# Patient Record
Sex: FEMALE | Race: WHITE | Employment: FULL TIME | URBAN - METROPOLITAN AREA
[De-identification: names, ages, dates, MRNs, and addresses within clinical notes are randomized per-mention and may not be internally consistent; named-entity substitution may affect disease eponyms.]

---

## 2017-02-04 ENCOUNTER — APPOINTMENT (OUTPATIENT)
Dept: GENERAL RADIOLOGY | Age: 62
DRG: 386 | End: 2017-02-04
Attending: INTERNAL MEDICINE
Payer: COMMERCIAL

## 2017-02-04 ENCOUNTER — APPOINTMENT (OUTPATIENT)
Dept: CT IMAGING | Age: 62
DRG: 386 | End: 2017-02-04
Attending: INTERNAL MEDICINE
Payer: COMMERCIAL

## 2017-02-04 ENCOUNTER — HOSPITAL ENCOUNTER (INPATIENT)
Age: 62
LOS: 3 days | Discharge: HOME | DRG: 386 | End: 2017-02-07
Attending: INTERNAL MEDICINE | Admitting: SURGERY
Payer: COMMERCIAL

## 2017-02-04 DIAGNOSIS — K56.609 SBO (SMALL BOWEL OBSTRUCTION) (HCC): Primary | ICD-10-CM

## 2017-02-04 DIAGNOSIS — G43.A0 CYCLICAL VOMITING WITH NAUSEA, INTRACTABILITY OF VOMITING NOT SPECIFIED: ICD-10-CM

## 2017-02-04 DIAGNOSIS — R10.10 PAIN OF UPPER ABDOMEN: ICD-10-CM

## 2017-02-04 PROBLEM — N39.0 UTI (URINARY TRACT INFECTION): Status: ACTIVE | Noted: 2017-02-04

## 2017-02-04 LAB
ALBUMIN SERPL BCP-MCNC: 4.5 G/DL (ref 3.4–5)
ALBUMIN/GLOB SERPL: 1.3 {RATIO} (ref 0.8–1.7)
ALP SERPL-CCNC: 48 U/L (ref 45–117)
ALT SERPL-CCNC: 36 U/L (ref 13–56)
ANION GAP BLD CALC-SCNC: 14 MMOL/L (ref 3–18)
APPEARANCE UR: CLEAR
AST SERPL W P-5'-P-CCNC: 32 U/L (ref 15–37)
BACTERIA URNS QL MICRO: ABNORMAL /HPF
BASOPHILS # BLD AUTO: 0.1 K/UL (ref 0–0.06)
BASOPHILS # BLD: 0 % (ref 0–2)
BILIRUB SERPL-MCNC: 0.5 MG/DL (ref 0.2–1)
BILIRUB UR QL: NEGATIVE
BUN SERPL-MCNC: 13 MG/DL (ref 7–18)
BUN/CREAT SERPL: 11 (ref 12–20)
CALCIUM SERPL-MCNC: 9.3 MG/DL (ref 8.5–10.1)
CHLORIDE SERPL-SCNC: 103 MMOL/L (ref 100–108)
CK MB CFR SERPL CALC: 9.2 % (ref 0–4)
CK MB SERPL-MCNC: 15.7 NG/ML (ref 0.5–3.6)
CK SERPL-CCNC: 171 U/L (ref 26–192)
CO2 SERPL-SCNC: 26 MMOL/L (ref 21–32)
COLOR UR: YELLOW
CREAT SERPL-MCNC: 1.19 MG/DL (ref 0.6–1.3)
DIFFERENTIAL METHOD BLD: ABNORMAL
EOSINOPHIL # BLD: 0.4 K/UL (ref 0–0.4)
EOSINOPHIL NFR BLD: 2 % (ref 0–5)
EPITH CASTS URNS QL MICRO: ABNORMAL /LPF (ref 0–5)
ERYTHROCYTE [DISTWIDTH] IN BLOOD BY AUTOMATED COUNT: 13.1 % (ref 11.6–14.5)
EST. AVERAGE GLUCOSE BLD GHB EST-MCNC: 120 MG/DL
GLOBULIN SER CALC-MCNC: 3.4 G/DL (ref 2–4)
GLUCOSE SERPL-MCNC: 148 MG/DL (ref 74–99)
GLUCOSE UR STRIP.AUTO-MCNC: NEGATIVE MG/DL
HBA1C MFR BLD: 5.8 % (ref 4.5–5.6)
HCT VFR BLD AUTO: 41.5 % (ref 35–45)
HGB BLD-MCNC: 13.8 G/DL (ref 12–16)
HGB UR QL STRIP: NEGATIVE
KETONES UR QL STRIP.AUTO: NEGATIVE MG/DL
LACTATE SERPL-SCNC: 1.2 MMOL/L (ref 0.4–2)
LACTATE SERPL-SCNC: 2.8 MMOL/L (ref 0.4–2)
LEUKOCYTE ESTERASE UR QL STRIP.AUTO: ABNORMAL
LIPASE SERPL-CCNC: 95 U/L (ref 73–393)
LYMPHOCYTES # BLD AUTO: 13 % (ref 21–52)
LYMPHOCYTES # BLD: 2.1 K/UL (ref 0.9–3.6)
MAGNESIUM SERPL-MCNC: 1.7 MG/DL (ref 1.8–2.4)
MCH RBC QN AUTO: 29.9 PG (ref 24–34)
MCHC RBC AUTO-ENTMCNC: 33.3 G/DL (ref 31–37)
MCV RBC AUTO: 89.8 FL (ref 74–97)
MONOCYTES # BLD: 1.3 K/UL (ref 0.05–1.2)
MONOCYTES NFR BLD AUTO: 8 % (ref 3–10)
MUCOUS THREADS URNS QL MICRO: ABNORMAL /LPF
NEUTS SEG # BLD: 12.6 K/UL (ref 1.8–8)
NEUTS SEG NFR BLD AUTO: 77 % (ref 40–73)
NITRITE UR QL STRIP.AUTO: NEGATIVE
PH UR STRIP: 6.5 [PH] (ref 5–8)
PLATELET # BLD AUTO: 450 K/UL (ref 135–420)
PMV BLD AUTO: 9.7 FL (ref 9.2–11.8)
POTASSIUM SERPL-SCNC: 4.1 MMOL/L (ref 3.5–5.5)
PROT SERPL-MCNC: 7.9 G/DL (ref 6.4–8.2)
PROT UR STRIP-MCNC: NEGATIVE MG/DL
RBC # BLD AUTO: 4.62 M/UL (ref 4.2–5.3)
RBC #/AREA URNS HPF: ABNORMAL /HPF (ref 0–5)
SODIUM SERPL-SCNC: 143 MMOL/L (ref 136–145)
SP GR UR REFRACTOMETRY: 1.02 (ref 1–1.03)
TROPONIN I SERPL-MCNC: <0.02 NG/ML (ref 0–0.06)
UROBILINOGEN UR QL STRIP.AUTO: 0.2 EU/DL (ref 0.2–1)
WBC # BLD AUTO: 16.4 K/UL (ref 4.6–13.2)
WBC URNS QL MICRO: ABNORMAL /HPF (ref 0–5)

## 2017-02-04 PROCEDURE — 96375 TX/PRO/DX INJ NEW DRUG ADDON: CPT

## 2017-02-04 PROCEDURE — 71010 XR CHEST PORT: CPT

## 2017-02-04 PROCEDURE — 65270000029 HC RM PRIVATE

## 2017-02-04 PROCEDURE — 83735 ASSAY OF MAGNESIUM: CPT | Performed by: INTERNAL MEDICINE

## 2017-02-04 PROCEDURE — 74177 CT ABD & PELVIS W/CONTRAST: CPT

## 2017-02-04 PROCEDURE — 85025 COMPLETE CBC W/AUTO DIFF WBC: CPT | Performed by: INTERNAL MEDICINE

## 2017-02-04 PROCEDURE — 96361 HYDRATE IV INFUSION ADD-ON: CPT

## 2017-02-04 PROCEDURE — 96374 THER/PROPH/DIAG INJ IV PUSH: CPT

## 2017-02-04 PROCEDURE — 80053 COMPREHEN METABOLIC PANEL: CPT | Performed by: INTERNAL MEDICINE

## 2017-02-04 PROCEDURE — 0D9670Z DRAINAGE OF STOMACH WITH DRAINAGE DEVICE, VIA NATURAL OR ARTIFICIAL OPENING: ICD-10-PCS | Performed by: SURGERY

## 2017-02-04 PROCEDURE — 74000 XR ABD (KUB): CPT

## 2017-02-04 PROCEDURE — 93005 ELECTROCARDIOGRAM TRACING: CPT

## 2017-02-04 PROCEDURE — 81001 URINALYSIS AUTO W/SCOPE: CPT | Performed by: INTERNAL MEDICINE

## 2017-02-04 PROCEDURE — 74011000250 HC RX REV CODE- 250: Performed by: INTERNAL MEDICINE

## 2017-02-04 PROCEDURE — 74011250636 HC RX REV CODE- 250/636: Performed by: INTERNAL MEDICINE

## 2017-02-04 PROCEDURE — 87086 URINE CULTURE/COLONY COUNT: CPT | Performed by: SURGERY

## 2017-02-04 PROCEDURE — 99285 EMERGENCY DEPT VISIT HI MDM: CPT

## 2017-02-04 PROCEDURE — 82550 ASSAY OF CK (CPK): CPT | Performed by: INTERNAL MEDICINE

## 2017-02-04 PROCEDURE — 83605 ASSAY OF LACTIC ACID: CPT | Performed by: INTERNAL MEDICINE

## 2017-02-04 PROCEDURE — 83690 ASSAY OF LIPASE: CPT | Performed by: INTERNAL MEDICINE

## 2017-02-04 PROCEDURE — 74011636320 HC RX REV CODE- 636/320: Performed by: INTERNAL MEDICINE

## 2017-02-04 PROCEDURE — 74011250636 HC RX REV CODE- 250/636: Performed by: SURGERY

## 2017-02-04 PROCEDURE — 83036 HEMOGLOBIN GLYCOSYLATED A1C: CPT | Performed by: INTERNAL MEDICINE

## 2017-02-04 RX ORDER — SODIUM CHLORIDE 0.9 % (FLUSH) 0.9 %
5-10 SYRINGE (ML) INJECTION EVERY 8 HOURS
Status: DISCONTINUED | OUTPATIENT
Start: 2017-02-04 | End: 2017-02-07 | Stop reason: HOSPADM

## 2017-02-04 RX ORDER — MONTELUKAST SODIUM 10 MG/1
10 TABLET ORAL
COMMUNITY

## 2017-02-04 RX ORDER — HYDROMORPHONE HYDROCHLORIDE 1 MG/ML
1 INJECTION, SOLUTION INTRAMUSCULAR; INTRAVENOUS; SUBCUTANEOUS
Status: DISCONTINUED | OUTPATIENT
Start: 2017-02-04 | End: 2017-02-06

## 2017-02-04 RX ORDER — HYDROMORPHONE HYDROCHLORIDE 1 MG/ML
1 INJECTION, SOLUTION INTRAMUSCULAR; INTRAVENOUS; SUBCUTANEOUS ONCE
Status: COMPLETED | OUTPATIENT
Start: 2017-02-04 | End: 2017-02-04

## 2017-02-04 RX ORDER — DIPHENHYDRAMINE HYDROCHLORIDE 50 MG/ML
12.5 INJECTION, SOLUTION INTRAMUSCULAR; INTRAVENOUS
Status: DISCONTINUED | OUTPATIENT
Start: 2017-02-04 | End: 2017-02-07 | Stop reason: HOSPADM

## 2017-02-04 RX ORDER — MORPHINE SULFATE 2 MG/ML
2 INJECTION, SOLUTION INTRAMUSCULAR; INTRAVENOUS
Status: COMPLETED | OUTPATIENT
Start: 2017-02-04 | End: 2017-02-04

## 2017-02-04 RX ORDER — KETOROLAC TROMETHAMINE 30 MG/ML
30 INJECTION, SOLUTION INTRAMUSCULAR; INTRAVENOUS
Status: ACTIVE | OUTPATIENT
Start: 2017-02-04 | End: 2017-02-05

## 2017-02-04 RX ORDER — FENOFIBRATE 120 MG/1
120 TABLET ORAL
COMMUNITY

## 2017-02-04 RX ORDER — DEXTROSE, SODIUM CHLORIDE, AND POTASSIUM CHLORIDE 5; .45; .15 G/100ML; G/100ML; G/100ML
125 INJECTION INTRAVENOUS CONTINUOUS
Status: DISCONTINUED | OUTPATIENT
Start: 2017-02-04 | End: 2017-02-06

## 2017-02-04 RX ORDER — SODIUM CHLORIDE 9 MG/ML
999 INJECTION, SOLUTION INTRAVENOUS CONTINUOUS
Status: DISCONTINUED | OUTPATIENT
Start: 2017-02-04 | End: 2017-02-06

## 2017-02-04 RX ORDER — MONTELUKAST SODIUM 4 MG/1
2 TABLET, CHEWABLE ORAL 2 TIMES DAILY
COMMUNITY

## 2017-02-04 RX ORDER — ONDANSETRON 2 MG/ML
4 INJECTION INTRAMUSCULAR; INTRAVENOUS
Status: COMPLETED | OUTPATIENT
Start: 2017-02-04 | End: 2017-02-04

## 2017-02-04 RX ORDER — SODIUM CHLORIDE 0.9 % (FLUSH) 0.9 %
5-10 SYRINGE (ML) INJECTION AS NEEDED
Status: DISCONTINUED | OUTPATIENT
Start: 2017-02-04 | End: 2017-02-07 | Stop reason: HOSPADM

## 2017-02-04 RX ORDER — FAMOTIDINE 10 MG/ML
20 INJECTION INTRAVENOUS
Status: COMPLETED | OUTPATIENT
Start: 2017-02-04 | End: 2017-02-04

## 2017-02-04 RX ORDER — LEVOCETIRIZINE DIHYDROCHLORIDE 5 MG/1
5 TABLET, FILM COATED ORAL
COMMUNITY

## 2017-02-04 RX ORDER — ONDANSETRON 2 MG/ML
4 INJECTION INTRAMUSCULAR; INTRAVENOUS
Status: DISCONTINUED | OUTPATIENT
Start: 2017-02-04 | End: 2017-02-07 | Stop reason: HOSPADM

## 2017-02-04 RX ADMIN — METHYLPREDNISOLONE SODIUM SUCCINATE 20 MG: 40 INJECTION, POWDER, FOR SOLUTION INTRAMUSCULAR; INTRAVENOUS at 14:57

## 2017-02-04 RX ADMIN — Medication 10 ML: at 21:02

## 2017-02-04 RX ADMIN — HYDROMORPHONE HYDROCHLORIDE 1 MG: 1 INJECTION, SOLUTION INTRAMUSCULAR; INTRAVENOUS; SUBCUTANEOUS at 07:10

## 2017-02-04 RX ADMIN — HYDROMORPHONE HYDROCHLORIDE 1 MG: 1 INJECTION, SOLUTION INTRAMUSCULAR; INTRAVENOUS; SUBCUTANEOUS at 21:01

## 2017-02-04 RX ADMIN — METHYLPREDNISOLONE SODIUM SUCCINATE 20 MG: 40 INJECTION, POWDER, FOR SOLUTION INTRAMUSCULAR; INTRAVENOUS at 21:01

## 2017-02-04 RX ADMIN — FAMOTIDINE 20 MG: 10 INJECTION, SOLUTION INTRAVENOUS at 04:29

## 2017-02-04 RX ADMIN — IOPAMIDOL 100 ML: 612 INJECTION, SOLUTION INTRAVENOUS at 05:54

## 2017-02-04 RX ADMIN — Medication 2 MG: at 04:30

## 2017-02-04 RX ADMIN — ONDANSETRON 4 MG: 2 INJECTION INTRAMUSCULAR; INTRAVENOUS at 04:29

## 2017-02-04 RX ADMIN — SODIUM CHLORIDE 125 ML/HR: 900 INJECTION, SOLUTION INTRAVENOUS at 04:28

## 2017-02-04 RX ADMIN — DEXTROSE MONOHYDRATE, SODIUM CHLORIDE, AND POTASSIUM CHLORIDE 125 ML/HR: 50; 4.5; 1.49 INJECTION, SOLUTION INTRAVENOUS at 10:47

## 2017-02-04 RX ADMIN — SODIUM CHLORIDE 1000 ML: 900 INJECTION, SOLUTION INTRAVENOUS at 07:58

## 2017-02-04 NOTE — ED NOTES
D/W pt at length that she will not be able to be discharged without a ride at bedside even if she is ready for discharge until at least 0830 r/t IV narcotic pain medication. Pt states that her mother lives right next door to the hospital. Advised pt that she cannot be released to walk either. Pt verbalized understanding and agrees.

## 2017-02-04 NOTE — ROUTINE PROCESS
TRANSFER - OUT REPORT:    Verbal report given to CHICA Dacosta (name) on Gwynne August  being transferred to [address] [place] [address] (unit) for routine progression of care       Report consisted of patients Situation, Background, Assessment and   Recommendations(SBAR). Information from the following report(s) SBAR, Kardex, ED Summary, [facility] and Recent Results MEWS  was reviewed with the receiving nurse. Lines:   Peripheral IV 02/04/17 Right Antecubital (Active)        Opportunity for questions and clarification was provided. Patient transported with:   Tech      Lactic acid in process  KUB completed, awaiting results.

## 2017-02-04 NOTE — ED NOTES
Called to CT to check if pt can come for study. CT tech states that he will call to ER when he is ready for pt.

## 2017-02-04 NOTE — H&P
[address] [place] [address]  ROUTINE HISTORY AND PHYSICAL    Name:  Aly Brown  MR#:  [id number]  :  1955  Account #:  [de-identified]  Date of Adm:  2017      HISTORY OF PRESENT ILLNESS: The patient is a 22-year-old  patient who has a history of Crohn's disease and previous surgeries for  Crohn's disease and bowel resection. She presents with 1 day of  abdominal pain. The patient was seen in the emergency room, where a  CT scan showed bowel obstruction. The patient is followed by Dr. Washington Huff and sees her yearly, she says. She says she has had bloating  and gas pain for quite some time; however, did not follow up with her  doctor for this. She is on medications for Crohn's disease. She has had  surgery years ago. One was at [facility]. Her medical records are  unavailable. PAST MEDICAL HISTORY: Significant for Crohn's disease,  hypertension, depression, anxiety. Please see patient's list of medications and allergies on MAR. REVIEW OF SYSTEMS: As above. SOCIAL HISTORY: She does not smoke or drink. FAMILY HISTORY: Noncontributory. PHYSICAL EXAMINATION  VITAL SIGNS: Stable. She is afebrile. HEENT: Unremarkable. LUNGS: Clear to auscultation bilaterally. HEART: Regular rate and rhythm. No murmurs, gallops or rubs. ABDOMEN: Soft, slightly distended, general tenderness. No rebound. No hernias. Well-healed large midline scar. NEUROLOGIC: Grossly intact. PSYCHIATRIC: Grossly intact. MUSCULOSKELETAL: Grossly intact, +2 pulses bilaterally. ASSESSMENT AND PLAN: This is a patient who presents with a  history of Crohn's disease, possible bowel obstruction. NG tube has  been placed. She will be started on IV antibiotics.  I am unsure of her  medications, however, I will consult Gastroenterology to recommend  medications for an acute exasperation of her Crohn's disease, given  that she has had 2 bowel resections previously would be advisable to  try and treat this conservatively without further surgery. I am going to  recommend treating her conservatively with medication to try and get  her over this episode. She will require further studies such as a small  bowel study and to try and get her operative reports. I have discussed  this with the patient and she understands.         MD Dimitrios Gandhi / [employer]  D:  02/04/2017   09:51  T:  02/04/2017   10:24  Job #:  [id number]

## 2017-02-04 NOTE — ED TRIAGE NOTES
Pt brought in by [facility]  for abdominal pain and exacerbation of her crohn's disease. Pt states \" I started hurting around 17:00 pm and then it woke me around mid night. I have had 2 episodes of vomiting and am having diarrhea. The pain is a 7/10 in my stomach. \"  Vitals:    02/04/17 0349   BP: 117/70   Pulse: 72   Resp: 18   Temp: 98 °F (36.7 °C)   SpO2: 100%     Sepsis Screening completed    (  )Patient meets SIRS criteria. (  x)Patient does not meet SIRS criteria.       SIRS Criteria is achieved when two or more of the following are present   Temperature < 96.8°F (36°C) or > 100.9°F (38.3°C)   Heart Rate > 90 beats per minute   Respiratory Rate > 20 beats per minute   WBC count > 12,000 or <4,000 or > 10% bands

## 2017-02-04 NOTE — IP AVS SNAPSHOT
Maliha Stoner 
 
 
 [address] [place] [address] 
[phone] Patient: Odie Severin MRN: [id number] You are allergic to the following Allergen Reactions Demerol (Meperidine) Nausea and Vomiting Immunizations Administered for This Admission Name Date Influenza Vaccine (Quad) PF 2/6/2017 Recent Documentation Height Weight BMI OB Status Smoking Status 1.626 m 62.8 kg 23.76 kg/m2 Hysterectomy Never Smoker Unresulted Labs Order Current Status CALPROTECTIN, FECAL In process PANCREATIC ELASTASE, FECAL In process VITAMIN D, 1, 25 DIHYDROXY In process WBC, STOOL In process Emergency Contacts Name Discharge Info Relation Home Work Mobile Connor Sandoval DISCHARGE CAREGIVER [3] Spouse [3] [phone] About your hospitalization You were admitted on:  February 4, 2017 You last received care in the:  [address] [place] [address] You were discharged on:  February 7, 2017 Unit phone number:  [phone] Why you were hospitalized Your primary diagnosis was:  Not on File Your diagnoses also included:  Sbo (Small Bowel Obstruction) (Hcc), Uti (Urinary Tract Infection) Providers Seen During Your Hospitalizations Provider Role Specialty Primary office phone Nathen Hwang MD Attending Provider Emergency Medicine [phone] Jose A Gutierrez MD Attending Provider General Surgery [phone] Your Primary Care Physician (PCP) Primary Care Physician Office Phone Office Fax [address] [facility] [phone] Follow-up Information Follow up With Details Comments Contact Info Ba Brown MD On 2/10/2017 Follow up appointment scheduled for February 10, 2017 at 10:20 a.m. [address] [place] [address] [facility] 
[phone] Carla Moore MD On 2/9/2017 Follow up appointment scheduled for February 9, 2017 at 10:00 a.m. [address] [place] [address] [facility] [address] [facility] 
[phone] Toño Kiser DO Schedule an appointment as soon as possible for a visit in 2 days  [address] [facility] [address] [facility] 
[phone] Current Discharge Medication List  
  
START taking these medications Dose & Instructions Dispensing Information Comments Morning Noon Evening Bedtime  
 predniSONE 20 mg tablet Commonly known as:  Del Rosario Stair Your next dose is: Today, Tomorrow Other:  _________ Dose:  40 mg Take 2 Tabs by mouth daily. Quantity:  40 Tab Refills:  0 CONTINUE these medications which have NOT CHANGED Dose & Instructions Dispensing Information Comments Morning Noon Evening Bedtime ATENOLOL PO Your next dose is: Today, Tomorrow Other:  _________ Dose:  50 mg Take 50 mg by mouth nightly. Refills:  0  
     
   
   
   
  
 colestipol 1 gram tablet Commonly known as:  COLESTID Your next dose is: Today, Tomorrow Other:  _________ Dose:  2 g Take 2 g by mouth two (2) times a day. Refills:  0 Fenofibrate 120 mg Tab Your next dose is: Today, Tomorrow Other:  _________ Dose:  120 mg Take 120 mg by mouth nightly. Refills:  0  
     
   
   
   
  
 HUMIRA PEN 40 mg/0.8 mL Pnkt Generic drug:  adalimumab Your next dose is: Today, Tomorrow Other:  _________  
   
   
 by SubCUTAneous route. Indications: CROHN'S DISEASE Refills:  0  
     
   
   
   
  
 levocetirizine 5 mg tablet Commonly known as:  Jorene Ashing Your next dose is: Today, Tomorrow Other:  _________ Dose:  5 mg Take 5 mg by mouth nightly. Refills:  0 montelukast 10 mg tablet Commonly known as:  SINGULAIR Your next dose is: Today, Tomorrow Other:  _________ Dose:  10 mg Take 10 mg by mouth nightly. Refills:  0  
     
   
   
   
  
 SYNTHROID PO Your next dose is: Today, Tomorrow Other:  _________ Dose:  112 mcg Take 112 mcg by mouth daily. Refills:  0  
     
   
   
   
  
 ZOLOFT PO Your next dose is: Today, Tomorrow Other:  _________ Dose:  50 mg Take 50 mg by mouth daily. Refills:  0 Where to Get Your Medications Information on where to get these meds will be given to you by the nurse or doctor. ! Ask your nurse or doctor about these medications  
  predniSONE 20 mg tablet Discharge Instructions DISCHARGE SUMMARY from Nurse The following personal items are in your possession at time of discharge: 
 
Dental Appliances: None Visual Aid: Glasses Home Medications: None Jewelry: Ring (wedding ring) Clothing: Bathrobe, Undergarments, Shirt, Pants, Footwear Other Valuables: Cell Phone () PATIENT INSTRUCTIONS: 
 
 
F-face looks uneven A-arms unable to move or move unevenly S-speech slurred or non-existent T-time-call 911 as soon as signs and symptoms begin-DO NOT go Back to bed or wait to see if you get better-TIME IS BRAIN. Warning Signs of HEART ATTACK Call 911 if you have these symptoms: 
? Chest discomfort. Most heart attacks involve discomfort in the center of the chest that lasts more than a few minutes, or that goes away and comes back. It can feel like uncomfortable pressure, squeezing, fullness, or pain. ? Discomfort in other areas of the upper body. Symptoms can include pain or discomfort in one or both arms, the back, neck, jaw, or stomach. ? Shortness of breath with or without chest discomfort. ? Other signs may include breaking out in a cold sweat, nausea, or lightheadedness. Don't wait more than five minutes to call [address]! Fast action can save your life. Calling 911 is almost always the fastest way to get lifesaving treatment. Emergency Medical Services staff can begin treatment when they arrive  up to an hour sooner than if someone gets to the hospital by car. The discharge information has been reviewed with the patient. The patient verbalized understanding. Discharge medications reviewed with the patient and appropriate educational materials and side effects teaching were provided. Patient armband removed and shredded Abdominal Pain: Care Instructions Your Care Instructions Abdominal pain has many possible causes. Some aren't serious and get better on their own in a few days. Others need more testing and treatment. If your pain continues or gets worse, you need to be rechecked and may need more tests to find out what is wrong. You may need surgery to correct the problem. Don't ignore new symptoms, such as fever, nausea and vomiting, urination problems, pain that gets worse, and dizziness. These may be signs of a more serious problem. Your doctor may have recommended a follow-up visit in the next 8 to 12 hours. If you are not getting better, you may need more tests or treatment. The doctor has checked you carefully, but problems can develop later. If you notice any problems or new symptoms, get medical treatment right away. Follow-up care is a key part of your treatment and safety. Be sure to make and go to all appointments, and call your doctor if you are having problems.  It's also a good idea to know your test results and keep a list of the medicines you take. How can you care for yourself at home? · Rest until you feel better. · To prevent dehydration, drink plenty of fluids, enough so that your urine is light yellow or clear like water. Choose water and other caffeine-free clear liquids until you feel better. If you have kidney, heart, or liver disease and have to limit fluids, talk with your doctor before you increase the amount of fluids you drink. · If your stomach is upset, eat mild foods, such as rice, dry toast or crackers, bananas, and applesauce. Try eating several small meals instead of two or three large ones. · Wait until 48 hours after all symptoms have gone away before you have spicy foods, alcohol, and drinks that contain caffeine. · Do not eat foods that are high in fat. · Avoid anti-inflammatory medicines such as aspirin, ibuprofen (Advil, Motrin), and naproxen (Aleve). These can cause stomach upset. Talk to your doctor if you take daily aspirin for another health problem. When should you call for help? Call 911 anytime you think you may need emergency care. For example, call if: 
· You passed out (lost consciousness). · You pass maroon or very bloody stools. · You vomit blood or what looks like coffee grounds. · You have new, severe belly pain. Call your doctor now or seek immediate medical care if: 
· Your pain gets worse, especially if it becomes focused in one area of your belly. · You have a new or higher fever. · Your stools are black and look like tar, or they have streaks of blood. · You have unexpected vaginal bleeding. · You have symptoms of a urinary tract infection. These may include: 
¨ Pain when you urinate. ¨ Urinating more often than usual. 
¨ Blood in your urine. · You are dizzy or lightheaded, or you feel like you may faint. Watch closely for changes in your health, and be sure to contact your doctor if: 
· You are not getting better after 1 day (24 hours). Where can you learn more? Go to http://jose m-ruthy.info/. Enter U633 in the search box to learn more about \"Abdominal Pain: Care Instructions. \" Current as of: May 27, 2016 Content Version: 11.1 © 4855-5682 [employer]. Care instructions adapted under license by [employer] (which disclaims liability or warranty for this information). If you have questions about a medical condition or this instruction, always ask your healthcare professional. [place] [address] any warranty or liability for your use of this information. Discharge Orders None [employer] Announcement We are excited to announce that we are making your provider's discharge notes available to you in [employer]. You will see these notes when they are completed and signed by the physician that discharged you from your recent hospital stay. If you have any questions or concerns about any information you see in [employer], please call the Health Information Department where you were seen or reach out to your Primary Care Provider for more information about your plan of care. Introducing hospitals & HEALTH SERVICES! [employer] introduces [employer] patient portal. Now you can access parts of your medical record, email your doctor's office, and request medication refills online. 1. In your internet browser, go to [email or web address] 2. Click on the First Time User? Click Here link in the Sign In box. You will see the New Member Sign Up page. 3. Enter your [employer] Access Code exactly as it appears below. You will not need to use this code after youve completed the sign-up process. If you do not sign up before the expiration date, you must request a new code. · [employer] Access Code: [email or web address] Expires: 5/5/2017  5:39 AM 
 
4. Enter the last four digits of your Social Security Number (xxxx) and Date of Birth (mm/dd/yyyy) as indicated and click Submit.  You will be taken to the next sign-up page. 5. Create a [employer] ID. This will be your [employer] login ID and cannot be changed, so think of one that is secure and easy to remember. 6. Create a [employer] password. You can change your password at any time. 7. Enter your Password Reset Question and Answer. This can be used at a later time if you forget your password. 8. Enter your e-mail address. You will receive e-mail notification when new information is available in [address] 9. Click Sign Up. You can now view and download portions of your medical record. 10. Click the Download Summary menu link to download a portable copy of your medical information. If you have questions, please visit the Frequently Asked Questions section of the [employer] website. Remember, [employer] is NOT to be used for urgent needs. For medical emergencies, dial 911. Now available from your iPhone and Android! General Information Please provide this summary of care documentation to your next provider. Patient Signature:  ____________________________________________________________ Date:  ____________________________________________________________  
  
[employer] Provider Signature:  ____________________________________________________________ Date:  ____________________________________________________________

## 2017-02-04 NOTE — CONSULTS
[address] [place] [address] Rd    Name:  Jacy Foster  MR#:  [id number]  :  1955  Account #:  [de-identified]  Date of Adm:  2017  Date of Consultation:  2017      A 57-year-old female who was admitted early this morning to our  emergency room where she came by ambulance. The patient claimed  that she has Crohn disease since that was discovered in  during  cholecystectomy, but she was symptomatic for years before. She  underwent 2 small bowel resections in  and  for a total of 3-  1/2 feet. We do not have any previous record on her. [place] [facility] [place] was apparently treated with Remicade for 6  months, but then this was stopped because she moved and was  started on Humira about a year and a half ago by Dr. Tay Anderson. The  patient did not notice a major change since she started the Humira. She has 3-6 loose or liquid, foul-smelling bowel movements a day. She  has been bloated, complaining of flatulence for many years. She rarely  has stools at night. Frequently she has urgency and rarely she can  have fecal incontinence. She denies having any perianal disease or  fistula. Her last colonoscopy apparently from 2 years ago, she was told  to have anastomotic stenosis that would not be passed or dilated. We  do not have a record of this endoscopy either. Her last visit with Dr. Tay Anderson was in 2016. She has been on Humira for a year and half    On top of her 2 previous small bowel surgeries, she had a tubal  pregnancy, total abdominal hysterectomy. She has hypertension and  hypothyroidism. She also has depression. She has been  from her  since 2016. She  works in an office. She has 2 grown up children. She has been  depressed and under stress. ALLERGIES: DEMEROL. MEDICATIONS INCLUDE  1. Humira 40 mg every 2 weeks subcutaneous. 2. Synthroid. 3. Zoloft. 4. Atenolol.     FAMILY HISTORY: No family history of cancer. SOCIAL HISTORY: She does not smoke or drink alcohol. She claimed  that she takes ibuprofen 2 to 3 times every 1 or 2 weeks for finger pain. She claimed that she took the last year a lot of prednisone for upper  respiratory allergies but not for her Crohn disease. FUNCTIONAL INQUIRY: The patient claimed that she started to have  severe, crampy lower abdominal pain that started in the lower  abdomen and then became diffuse and more prominent in the upper  abdomen, associated with bloating, but no increase in abdominal  noise. She vomited on 4 occasions, the first 2 were larger, containing  thick bile and the 2 others were lighter. It made her feel better. She has  not vomited since early this morning around 3 o'clock. She called the  ambulance around 3:30 because she was feeling weak and had  severe abdominal pain and was nauseous. VITAL SIGNS: On arrival at the emergency room, her vital signs were  normal. Blood pressure was 117/70, pulse was 72, temperature 98. Breathing 18, saturation 100%. CT scan of the abdomen and pelvis  showed only mild small bowel obstruction with the point of transition  just to the left median line near the anastomosis. There was also  another segment of bowel narrowing more proximally suggestive of  skip disease in a setting of Crohn disease, status post right  hemicolectomy. There was some small bowel dilatation up to 3.3 cm  maximum. A nasogastric tube was inserted and drained only about less than 500  mL of bilious, thick, material. There was no blood. The patient claimed that she had a bowel movement last night, loose  to soft. She claimed that she has not passed any gas since. Her pain  has resolved. She does not have presently any nausea or vomiting. Prior to this, she claimed that she used to have rare nausea with  certain tomatoes, but she was eating almost everything including  drinking regular milk. She has gained some weight.  She denies having  any rash, arthralgia, back pain, mouth sores or red eyes. She claimed  that she has been urinating more frequently recently, but denies having  any dysuria, hematuria, or burning sensation on voiding. Also, there  were no fevers, chills, shivers, or night sweats. She denies having any  shortness of breath, chest pain, coughing. She does have some upper  respiratory seasonal allergies, but no asthma. Presently, she is thirsty,  but she has good urine output. PHYSICAL EXAMINATION  GENERAL: We have a 68-year-old, pleasant,  female who is  resting comfortably in bed. She has a nasogastric tube in the right  nostril, draining a small amount of thick, greenish liquid. She weighs  127 pounds. Temperature 97.8, pulse 70, blood pressure 118/63,  breathing 14, saturation 98% on room air. SKIN: Normal. No evidence of any rash. HEENT: Eyes are unremarkable. The pupils are equal and reactive to  light. sclerae are anicteric. The conjunctivae are pink. Oropharyngeal  cavity is normal with moist and pink mucous membrane, normal teeth. NECK: Supple. No palpable mass, no enlarged thyroids. LUNGS: Clear to auscultation. CARDIAC: Rhythm is regular. S1, S2 normal. No murmur. ABDOMEN: Not distended, very soft. There is a very mild tenderness  in the right lower quadrant, but there is no guarding, mass, or  organomegaly. Bowel sounds are diminished, but not metallic or  increased. She has a previous surgical scar. EXTREMITIES: Unremarkable. No pedal edema, no clubbing, no  tremor. NEUROLOGIC: No focal neurological signs. She is alert and oriented. Moves all her extremities. LABORATORY DATA: Blood tests, we have a hemoglobin of 13.8,  WBC 16.4, normal MCV, MCH. The neutrophils 77, platelets 782. Urinalysis, there is a trace leukocyte esterase with 3-5 WBC and 2-3  RBC. Electrolytes normal. Glucose 148, BUN 13, creatinine 1.19.   Normal LFT, albumin at 4.5, normal also lipase, but elevated CPK-MB  index, however, troponin is normal at below 0.02. Her lactic acid was  initially elevated at 2.8 at 4 o'clock in the morning and dropped to 1.2 at  8 o'clock. Urine culture has been requested and pending. CT scan as I mentioned above, was remarkable for a mild small bowel  obstruction just above the anastomosis. CONCLUSION: This is a 57-year-old female with a longstanding  history of Crohn disease of the small bowel that has been progressing  since the 1980s and was confirmed during a cholecystectomy in 1987. She underwent 2 small bowel resections in 1990 and [place] [facility] [place], for a total of 3.5 feet long. She has been on Humira for the  past year and a half, but was still complaining of mild diarrhea,  bloating, and flatulence. Her last colonoscopy apparently 2 years ago  showed stenosis at the level of the anastomosis. She comes with a  small bowel obstruction. The CT scan showed that there was mild  obstruction with 2 segments of thickened small bowel wall in the  terminal ileum. Most likely there is a good component of scarring from her chronic  Crohn disease, but there is probably some element of inflammation  that we can act on by presently putting her on a short-term of steroids. She needs to go back to see Dr. Kisha Kim to check on the blood level  of Humira and antibodies to see if it is reaching therapeutic levels  before considering changing it. I told the patient that if she improves,  then we can remove the nasogastric tube, do a small bowel  followthrough, and re-evaluate the stenosis. I told her that if she should  not improve or that the occlusion keeps recurring on multiple  occasions, then she may require to have surgery to remove that  stenotic part. We tried to avoid in her case doing more surgery as she  already lost 3-1/2 feet of her small bowel. She should be getting  vitamin B12 on a regular basis.     Her other health problems included hypertension, depression, and  hypothyroidism. Further note will follow. ASHLYN Davis MD    ME / TB  D:  02/04/2017   14:21  T:  02/04/2017   15:58  Job #:  [id number]    Ana Donovan MD

## 2017-02-04 NOTE — ED NOTES
Report received from CHICA Vasquez, patient laying in bed, voices no complaints or concerns, call light in reach, bed in locked and lowest position.

## 2017-02-04 NOTE — PROGRESS NOTES
Shift summary: pt pleasant and cooperative with care. Pt states biggest discomfort is NGT to sinus track and throat. NGT to LIS. Pt up to BR with minimal assist.  at bedside this afternoon.

## 2017-02-04 NOTE — ED PROVIDER NOTES
HPI Comments: 3:48 AM   Rosendo Nick is a 59 y.o. Female with Hx of Crohn's disease presenting to the ED via EMS C/O acute on chronic abdominal pain (7/10)  onset 10 hours ago but worsened 4 hours ago. Pt states the pain woke her up from sleep. Pt states that this feels like a flare up of her Crohn's disease. Pt has vomited twice. Pt also complains of nausea, diaphoresis. PSHx  bowel resection (2x), tubal ligation and cholecystectomy. Pt denies tobacco, ETOH and illicit drug use. Pt denies fever, chest pain, chills, coughing, sore throat and any other Sx or complaints. Patient is a 59 y.o. female presenting with abdominal pain. The history is provided by the patient. No  was used. Abdominal Pain    This is a new problem. The current episode started 6 to 12 hours ago (10 hours ago). The problem occurs constantly. The problem has been gradually worsening (4 hours ago). The pain is located in the generalized abdominal region. The pain is at a severity of 7/10. Associated symptoms include nausea and vomiting. Pertinent negatives include no fever and no chest pain. Her past medical history is significant for Crohn's disease. The patient's surgical history includes cholecystectomy. Written by MICHAEL Caceres, as dictated by Anuj Hannah MD      Past Medical History:   Diagnosis Date    Anxiety     Crohn's disease ([facility] [address]     Depression     Gastrointestinal disorder      thyroid    Hypertension        Past Surgical History:   Procedure Laterality Date    Pr abdomen surgery proc unlisted       bowel resection x2    Hx cholecystectomy      Hx gyn       tubal pregnancy, hysterectomy         History reviewed. No pertinent family history. Social History     Social History    Marital status:      Spouse name: N/A    Number of children: N/A    Years of education: N/A     Occupational History    Not on file.      Social History Main Topics    Smoking status: Never Smoker    Smokeless tobacco: Not on file    Alcohol use No    Drug use: Not on file    Sexual activity: Not on file     Other Topics Concern    Not on file     Social History Narrative    No narrative on file         ALLERGIES: Demerol [meperidine]    Review of Systems   Constitutional: Positive for diaphoresis. Negative for chills and fever. HENT: Negative for sore throat. Cardiovascular: Negative for chest pain. Gastrointestinal: Positive for abdominal pain, nausea and vomiting. All other systems reviewed and are negative. Vitals:    02/04/17 0349 02/04/17 0450 02/04/17 0635 02/04/17 0655   BP: 117/70 128/57 119/59 135/68   Pulse: 72 62 76 64   Resp: 18 16     Temp: 98 °F (36.7 °C)      SpO2: 100% 95% 95% 97%   Weight: 57.6 kg (127 lb)      Height: 5' 4\" (1.626 m)               Physical Exam   Constitutional: She is oriented to person, place, and time. She appears well-developed and well-nourished. HENT:   Head: Normocephalic and atraumatic. Right Ear: External ear normal.   Left Ear: External ear normal.   Nose: Nose normal.   Mouth/Throat: Oropharynx is clear and moist.   Eyes: Conjunctivae and EOM are normal. Pupils are equal, round, and reactive to light. Right eye exhibits no discharge. Left eye exhibits no discharge. No scleral icterus. Neck: Normal range of motion. Neck supple. No JVD present. No tracheal deviation present. Cardiovascular: Normal rate, regular rhythm, normal heart sounds and intact distal pulses. Pulmonary/Chest: Effort normal and breath sounds normal.   Abdominal: Soft. Bowel sounds are normal. She exhibits distension. There is tenderness (Diffuse tenderness). There is guarding (Minimal). There is no rebound and no CVA tenderness. Hypoactive bowel sounds   Musculoskeletal: Normal range of motion. Thoracic back: She exhibits tenderness.    Tender lower thoracic spine with paraspinal tenderness   Neurological: She is alert and oriented to person, place, and time. She has normal reflexes. No focal motor weakness. Skin: Skin is warm and dry. No rash noted. Multiple healed scars to abdomen   Psychiatric: She has a normal mood and affect. Her behavior is normal.   Nursing note and vitals reviewed. RESULTS:  EKG interpretation: (Preliminary)  NSR, No STEMI, 63 bpm  EKG read by Robi Amaro MD  at 4:29 AM    X-RAY FINDINGS:  4:54 AM  Chest x-ray shows no acute process  Pending review by Radiologist  Recorded by Sarah Rdz, MICHAEL Scribe, as dictated by Robi Amaro MD     CT ABD PELV W CONT   Final Result   IMPRESSION:     Mild small bowel obstruction with point of transition is just to left midline  near the anastomosis. Another segment of bowel narrowing more proximally. These  may represent skip lesions of Crohn's disease. Small bowel tumor particularly  lymphoma is also a much less likely consideration.     Status post right hemicolectomy.       As read by the radiologist.    XR CHEST PORT    (Results Pending)        Labs Reviewed   CBC WITH AUTOMATED DIFF - Abnormal; Notable for the following:        Result Value    WBC 16.4 (*)     PLATELET 249 (*)     NEUTROPHILS 77 (*)     LYMPHOCYTES 13 (*)     ABS. NEUTROPHILS 12.6 (*)     ABS. MONOCYTES 1.3 (*)     ABS.  BASOPHILS 0.1 (*)     All other components within normal limits   METABOLIC PANEL, COMPREHENSIVE - Abnormal; Notable for the following:     Glucose 148 (*)     BUN/Creatinine ratio 11 (*)     GFR est AA 56 (*)     GFR est non-AA 46 (*)     All other components within normal limits   URINALYSIS W/ RFLX MICROSCOPIC - Abnormal; Notable for the following:     Leukocyte Esterase TRACE (*)     All other components within normal limits   CARDIAC PANEL,(CK, CKMB & TROPONIN) - Abnormal; Notable for the following:     CK - MB 15.7 (*)     CK-MB Index 9.2 (*)     All other components within normal limits   MAGNESIUM - Abnormal; Notable for the following:     Magnesium 1.7 (*)     All other components within normal limits   LACTIC ACID, PLASMA - Abnormal; Notable for the following:     Lactic acid 2.8 (*)     All other components within normal limits   URINE MICROSCOPIC ONLY - Abnormal; Notable for the following:     Bacteria FEW (*)     Mucus 1+ (*)     All other components within normal limits   LIPASE   LACTIC ACID, PLASMA       Recent Results (from the past 12 hour(s))   CBC WITH AUTOMATED DIFF    Collection Time: 02/04/17  4:07 AM   Result Value Ref Range    WBC 16.4 (H) 4.6 - 13.2 K/uL    RBC 4.62 4.20 - 5.30 M/uL    HGB 13.8 12.0 - 16.0 g/dL    HCT 41.5 35.0 - 45.0 %    MCV 89.8 74.0 - 97.0 FL    MCH 29.9 24.0 - 34.0 PG    MCHC 33.3 31.0 - 37.0 g/dL    RDW 13.1 11.6 - 14.5 %    PLATELET 469 (H) 250 - 420 K/uL    MPV 9.7 9.2 - 11.8 FL    NEUTROPHILS 77 (H) 40 - 73 %    LYMPHOCYTES 13 (L) 21 - 52 %    MONOCYTES 8 3 - 10 %    EOSINOPHILS 2 0 - 5 %    BASOPHILS 0 0 - 2 %    ABS. NEUTROPHILS 12.6 (H) 1.8 - 8.0 K/UL    ABS. LYMPHOCYTES 2.1 0.9 - 3.6 K/UL    ABS. MONOCYTES 1.3 (H) 0.05 - 1.2 K/UL    ABS. EOSINOPHILS 0.4 0.0 - 0.4 K/UL    ABS. BASOPHILS 0.1 (H) 0.0 - 0.06 K/UL    DF AUTOMATED     METABOLIC PANEL, COMPREHENSIVE    Collection Time: 02/04/17  4:07 AM   Result Value Ref Range    Sodium 143 136 - 145 mmol/L    Potassium 4.1 3.5 - 5.5 mmol/L    Chloride 103 100 - 108 mmol/L    CO2 26 21 - 32 mmol/L    Anion gap 14 3.0 - 18 mmol/L    Glucose 148 (H) 74 - 99 mg/dL    BUN 13 7.0 - 18 MG/DL    Creatinine 1.19 0.6 - 1.3 MG/DL    BUN/Creatinine ratio 11 (L) 12 - 20      GFR est AA 56 (L) >60 ml/min/1.73m2    GFR est non-AA 46 (L) >60 ml/min/1.73m2    Calcium 9.3 8.5 - 10.1 MG/DL    Bilirubin, total 0.5 0.2 - 1.0 MG/DL    ALT (SGPT) 36 13 - 56 U/L    AST (SGOT) 32 15 - 37 U/L    Alk.  phosphatase 48 45 - 117 U/L    Protein, total 7.9 6.4 - 8.2 g/dL    Albumin 4.5 3.4 - 5.0 g/dL    Globulin 3.4 2.0 - 4.0 g/dL    A-G Ratio 1.3 0.8 - 1.7     CARDIAC PANEL,(CK, CKMB & TROPONIN)    Collection Time: 02/04/17  4:07 AM Result Value Ref Range     26 - 192 U/L    CK - MB 15.7 (H) 0.5 - 3.6 ng/ml    CK-MB Index 9.2 (H) 0.0 - 4.0 %    Troponin-I, Qt. <0.02 0.00 - 0.06 NG/ML   MAGNESIUM    Collection Time: 02/04/17  4:07 AM   Result Value Ref Range    Magnesium 1.7 (L) 1.8 - 2.4 mg/dL   LIPASE    Collection Time: 02/04/17  4:07 AM   Result Value Ref Range    Lipase 95 73 - 393 U/L   EKG, 12 LEAD, INITIAL    Collection Time: 02/04/17  4:16 AM   Result Value Ref Range    Ventricular Rate 63 BPM    Atrial Rate 63 BPM    P-R Interval 170 ms    QRS Duration 86 ms    Q-T Interval 438 ms    QTC Calculation (Bezet) 448 ms    Calculated P Axis 63 degrees    Calculated R Axis 55 degrees    Calculated T Axis 59 degrees    Diagnosis       Normal sinus rhythm  Possible Anterior infarct , age undetermined  Abnormal ECG  No previous ECGs available     LACTIC ACID, PLASMA    Collection Time: 02/04/17  4:19 AM   Result Value Ref Range    Lactic acid 2.8 (HH) 0.4 - 2.0 MMOL/L   URINALYSIS W/ RFLX MICROSCOPIC    Collection Time: 02/04/17  4:22 AM   Result Value Ref Range    Color YELLOW      Appearance CLEAR      Specific gravity 1.018 1.005 - 1.030      pH (UA) 6.5 5.0 - 8.0      Protein NEGATIVE  NEG mg/dL    Glucose NEGATIVE  NEG mg/dL    Ketone NEGATIVE  NEG mg/dL    Bilirubin NEGATIVE  NEG      Blood NEGATIVE  NEG      Urobilinogen 0.2 0.2 - 1.0 EU/dL    Nitrites NEGATIVE  NEG      Leukocyte Esterase TRACE (A) NEG     URINE MICROSCOPIC ONLY    Collection Time: 02/04/17  4:22 AM   Result Value Ref Range    WBC 3 to 5 0 - 5 /hpf    RBC 2 to 3 0 - 5 /hpf    Epithelial cells 4 to 6 0 - 5 /lpf    Bacteria FEW (A) NEG /hpf    Mucus 1+ (A) NEG /lpf         MDM  Number of Diagnoses or Management Options  Cyclical vomiting with nausea, intractability of vomiting not specified:   Pain of upper abdomen:   SBO (small bowel obstruction) ([facility] [address]):   Diagnosis management comments: DDx: Inflammatory bowel disease exacerbation, pancreatitis , GERD, hepatitis, r/o obstruction, mass , abscess. Doubt ischemic bowel. R/o UTI, pyelonephritis. There is anxiety component       Amount and/or Complexity of Data Reviewed  Clinical lab tests: reviewed and ordered (CBC, Comprehensive metabolic panel, Urinalysis w/ RFLX microscopic, Cardiac panel, Magnesium, Lipase)  Tests in the radiology section of CPT®: reviewed and ordered (Chest x-ray, CT Abd pelv w cont)  Tests in the medicine section of CPT®: reviewed and ordered (EKG)  Discuss the patient with other providers: yes Rory Garcia MD (.General Surgery))  Independent visualization of images, tracings, or specimens: yes (EKG, Chest x-ray)      ED Course     Medications   0.9% sodium chloride infusion (0 mL/hr IntraVENous IV Completed 2/4/17 0620)   sodium chloride 0.9 % bolus infusion 1,000 mL (not administered)   morphine injection 2 mg (2 mg IntraVENous Given 2/4/17 0430)   ondansetron (ZOFRAN) injection 4 mg (4 mg IntraVENous Given 2/4/17 0429)   famotidine (PF) (PEPCID) injection 20 mg (20 mg IntraVENous Given 2/4/17 0429)   iopamidol (ISOVUE 300) 61 % contrast injection 100 mL (100 mL IntraVENous Given 2/4/17 0554)   HYDROmorphone (PF) (DILAUDID) injection 1 mg (1 mg IntraVENous Given 2/4/17 0710)        Procedures  PROGRESS NOTE:  3:48 AM  Initial assessment performed. Written by MICHAEL López, as dictated by Chanell Woodward MD     Pt re-assesed, found to have improved pain, abd exam w/ hyperactive bs, soft, ttp. CONSULT NOTE:   7:29 Hair Turcios MD  spoke with Rory Garcia MD    Specialty: Surgery  Discussed pt's hx, disposition, and available diagnostic and imaging results. Reviewed care plans. Consulting physician agrees with plans as outlined. Agrees to accept pt.    Written by MICHAEL López, as dictated by Chanell Woodward MD      ADMISSION NOTE:  7:29 AM  Patient is being admitted to the hospital by Rory Garcia MD . The results of their tests and reasons for their admission have been discussed with them and/or available family. They convey agreement and understanding for the need to be admitted and for their admission diagnosis. Written by Carlotta Rahman, MICHAEL Scribe, as dictated by Daylin Macdonald MD .     CONDITIONS ON ADMISSION:  7:29 AM   Deep Vein Thrombosis is not present at the time of admission. Thrombosis is not present at the time of admission. Urinary Tract Infection is not present at the time of admission. Pneumonia is not present at the time of admission. MRSA is not present at the time of admission. Wound infection is not present at the time of admission. Pressure Ulcer is not present at the time of admission. Written by Carlotta aRhman, MICHAEL Scribe, as dictated by Daylin Macdonald MD .    CLINICAL IMPRESSION:    1. SBO (small bowel obstruction) (HCC)    2. Pain of upper abdomen    3. Cyclical vomiting with nausea, intractability of vomiting not specified        PLAN: Admit to Surgery  ATTESTATIONS:  This note is prepared by Carlotta Rahman, acting as Scribe for Daylin Macdonald MD .    Daylin Macdonald MD : The scribe's documentation has been prepared under my direction and personally reviewed by me in its entirety. I confirm that the note above accurately reflects all work, treatment, procedures, and medical decision making performed by me.

## 2017-02-04 NOTE — ROUTINE PROCESS
Bedside shift change report given to Garrett Landa RN (oncoming nurse) by Dane Muñoz RN   (offgoing nurse). Report included the following information SBAR, Kardex, Intake/Output, MAR and Recent Results.

## 2017-02-05 ENCOUNTER — APPOINTMENT (OUTPATIENT)
Dept: GENERAL RADIOLOGY | Age: 62
DRG: 386 | End: 2017-02-05
Attending: SURGERY
Payer: COMMERCIAL

## 2017-02-05 LAB
ANION GAP BLD CALC-SCNC: 11 MMOL/L (ref 3–18)
BACTERIA SPEC CULT: NORMAL
BASOPHILS # BLD AUTO: 0 K/UL (ref 0–0.06)
BASOPHILS # BLD: 0 % (ref 0–2)
BUN SERPL-MCNC: 9 MG/DL (ref 7–18)
BUN/CREAT SERPL: 8 (ref 12–20)
CALCIUM SERPL-MCNC: 7.9 MG/DL (ref 8.5–10.1)
CHLORIDE SERPL-SCNC: 105 MMOL/L (ref 100–108)
CO2 SERPL-SCNC: 24 MMOL/L (ref 21–32)
CREAT SERPL-MCNC: 1.07 MG/DL (ref 0.6–1.3)
CRP SERPL-MCNC: 1.4 MG/DL (ref 0–0.3)
DIFFERENTIAL METHOD BLD: ABNORMAL
EOSINOPHIL # BLD: 0 K/UL (ref 0–0.4)
EOSINOPHIL NFR BLD: 0 % (ref 0–5)
ERYTHROCYTE [DISTWIDTH] IN BLOOD BY AUTOMATED COUNT: 13.2 % (ref 11.6–14.5)
FOLATE SERPL-MCNC: >20 NG/ML (ref 3.1–17.5)
GLUCOSE SERPL-MCNC: 195 MG/DL (ref 74–99)
HCT VFR BLD AUTO: 34.7 % (ref 35–45)
HGB BLD-MCNC: 11.5 G/DL (ref 12–16)
IRON SATN MFR SERPL: 11 %
IRON SERPL-MCNC: 55 UG/DL (ref 50–175)
LYMPHOCYTES # BLD AUTO: 10 % (ref 21–52)
LYMPHOCYTES # BLD: 1 K/UL (ref 0.9–3.6)
MCH RBC QN AUTO: 29.9 PG (ref 24–34)
MCHC RBC AUTO-ENTMCNC: 33.1 G/DL (ref 31–37)
MCV RBC AUTO: 90.1 FL (ref 74–97)
MONOCYTES # BLD: 0.4 K/UL (ref 0.05–1.2)
MONOCYTES NFR BLD AUTO: 4 % (ref 3–10)
NEUTS SEG # BLD: 8.3 K/UL (ref 1.8–8)
NEUTS SEG NFR BLD AUTO: 86 % (ref 40–73)
PLATELET # BLD AUTO: 401 K/UL (ref 135–420)
PMV BLD AUTO: 9.8 FL (ref 9.2–11.8)
POTASSIUM SERPL-SCNC: 4.2 MMOL/L (ref 3.5–5.5)
RBC # BLD AUTO: 3.85 M/UL (ref 4.2–5.3)
SERVICE CMNT-IMP: NORMAL
SODIUM SERPL-SCNC: 140 MMOL/L (ref 136–145)
TIBC SERPL-MCNC: 485 UG/DL (ref 250–450)
VIT B12 SERPL-MCNC: 187 PG/ML (ref 211–911)
WBC # BLD AUTO: 9.6 K/UL (ref 4.6–13.2)

## 2017-02-05 PROCEDURE — 74011250636 HC RX REV CODE- 250/636: Performed by: INTERNAL MEDICINE

## 2017-02-05 PROCEDURE — 80048 BASIC METABOLIC PNL TOTAL CA: CPT | Performed by: INTERNAL MEDICINE

## 2017-02-05 PROCEDURE — 65270000029 HC RM PRIVATE

## 2017-02-05 PROCEDURE — 74020 XR ABD (AP AND ERECT OR DECUB): CPT

## 2017-02-05 PROCEDURE — 85025 COMPLETE CBC W/AUTO DIFF WBC: CPT | Performed by: INTERNAL MEDICINE

## 2017-02-05 PROCEDURE — 74011250636 HC RX REV CODE- 250/636: Performed by: SURGERY

## 2017-02-05 PROCEDURE — 83540 ASSAY OF IRON: CPT | Performed by: INTERNAL MEDICINE

## 2017-02-05 PROCEDURE — 82607 VITAMIN B-12: CPT | Performed by: INTERNAL MEDICINE

## 2017-02-05 PROCEDURE — 82652 VIT D 1 25-DIHYDROXY: CPT | Performed by: INTERNAL MEDICINE

## 2017-02-05 PROCEDURE — 86140 C-REACTIVE PROTEIN: CPT | Performed by: INTERNAL MEDICINE

## 2017-02-05 PROCEDURE — 36415 COLL VENOUS BLD VENIPUNCTURE: CPT | Performed by: INTERNAL MEDICINE

## 2017-02-05 RX ORDER — CYANOCOBALAMIN 1000 UG/ML
1000 INJECTION, SOLUTION INTRAMUSCULAR; SUBCUTANEOUS
Status: DISCONTINUED | OUTPATIENT
Start: 2017-02-05 | End: 2017-02-07 | Stop reason: HOSPADM

## 2017-02-05 RX ADMIN — DEXTROSE MONOHYDRATE, SODIUM CHLORIDE, AND POTASSIUM CHLORIDE 125 ML/HR: 50; 4.5; 1.49 INJECTION, SOLUTION INTRAVENOUS at 12:52

## 2017-02-05 RX ADMIN — HYDROMORPHONE HYDROCHLORIDE 1 MG: 1 INJECTION, SOLUTION INTRAMUSCULAR; INTRAVENOUS; SUBCUTANEOUS at 19:45

## 2017-02-05 RX ADMIN — Medication 10 ML: at 21:00

## 2017-02-05 RX ADMIN — HYDROMORPHONE HYDROCHLORIDE 1 MG: 1 INJECTION, SOLUTION INTRAMUSCULAR; INTRAVENOUS; SUBCUTANEOUS at 14:52

## 2017-02-05 RX ADMIN — DIPHENHYDRAMINE HYDROCHLORIDE 12.5 MG: 50 INJECTION, SOLUTION INTRAMUSCULAR; INTRAVENOUS at 14:36

## 2017-02-05 RX ADMIN — HYDROMORPHONE HYDROCHLORIDE 1 MG: 1 INJECTION, SOLUTION INTRAMUSCULAR; INTRAVENOUS; SUBCUTANEOUS at 04:15

## 2017-02-05 RX ADMIN — METHYLPREDNISOLONE SODIUM SUCCINATE 20 MG: 40 INJECTION, POWDER, FOR SOLUTION INTRAMUSCULAR; INTRAVENOUS at 21:00

## 2017-02-05 RX ADMIN — ONDANSETRON 4 MG: 2 INJECTION INTRAMUSCULAR; INTRAVENOUS at 22:17

## 2017-02-05 RX ADMIN — HYDROMORPHONE HYDROCHLORIDE 1 MG: 1 INJECTION, SOLUTION INTRAMUSCULAR; INTRAVENOUS; SUBCUTANEOUS at 10:18

## 2017-02-05 RX ADMIN — ONDANSETRON 4 MG: 2 INJECTION INTRAMUSCULAR; INTRAVENOUS at 12:52

## 2017-02-05 RX ADMIN — METHYLPREDNISOLONE SODIUM SUCCINATE 20 MG: 40 INJECTION, POWDER, FOR SOLUTION INTRAMUSCULAR; INTRAVENOUS at 12:52

## 2017-02-05 RX ADMIN — DEXTROSE MONOHYDRATE, SODIUM CHLORIDE, AND POTASSIUM CHLORIDE 125 ML/HR: 50; 4.5; 1.49 INJECTION, SOLUTION INTRAVENOUS at 04:14

## 2017-02-05 NOTE — PROGRESS NOTES
conducted an initial consultation and Spiritual Assessment for Jeferson Marion, who is a 59 y.o.,female. Patients Primary Language is: [place]. According to the patients EMR Congregational Affiliation is: [place]. The reason the Patient came to the hospital is:   Patient Active Problem List    Diagnosis Date Noted    SBO (small bowel obstruction) ([facility] [address] 02/04/2017    UTI (urinary tract infection) 02/04/2017        The  provided the following Interventions:  Initiated a relationship of care and support.  present. Explored issues of duane, belief, spirituality and Baptism/ritual needs while hospitalized. Listened empathically. Provided chaplaincy education. Provided information about Spiritual Care Services. Offered prayer and assurance of continued prayers on patient's behalf. Chart reviewed. The following outcomes were achieved:  Patient shared limited information about both their medical narrative and spiritual journey/beliefs. Patient processed feeling about current hospitalization. Patient expressed gratitude for the 's visit. Assessment:  Patient does not have any Baptism/cultural needs that will affect patients preferences in health care. Plan:  Chaplains will continue to follow and will provide pastoral care on an as needed/requested basis.  recommends bedside caregivers page  on duty if patient shows signs of acute spiritual or emotional distress.     Chaplain Glory Matos

## 2017-02-05 NOTE — ROUTINE PROCESS
Bedside and Verbal shift change report given to JOSELITO Horvath (oncoming nurse) by Yaritza Strong RN   (offgoing nurse). Report included the following information SBAR, Kardex, Procedure Summary, Intake/Output, MAR, Recent Results and Med Rec Status.

## 2017-02-05 NOTE — ROUTINE PROCESS
Bedside and Verbal shift change report given to Carolee Gustafson RN (oncoming nurse) by JOSELITO Julien RN (offgoing nurse). Report included the following information SBAR, Kardex, Intake/Output and MAR.

## 2017-02-05 NOTE — PROGRESS NOTES
Shift summary: Pt A&Ox4, up with assistance. NG tube in place with intermittent suction. Head pain managed with IV Dilaudid. No visible signs of distress. Appears to be resting comfortably.       Patient Vitals for the past 12 hrs:   Temp Pulse Resp BP SpO2   02/05/17 0346 98.2 °F (36.8 °C) 83 18 125/62 98 %   02/04/17 2331 98.1 °F (36.7 °C) 76 16 120/70 96 %   02/04/17 1902 99.2 °F (37.3 °C) 84 16 127/64 97 %       Date 02/04/17 0700 - 02/05/17 0659 02/05/17 0700 - 02/06/17 0659   Shift 1376-3984 2057-5184 24 Hour Total 9879-6193 8221-2413 24 Hour Total   I  N  T  A  K  E   I.V.  (mL/kg/hr) 0  (0) 1200 1200         Volume (dextrose 5% - 0.45% NaCl with KCl 20 mEq/L infusion) 0 1200 1200       Shift Total  (mL/kg) 0  (0) 1200  (19.4) 1200  (19.4)      O  U  T  P  U  T   Urine  (mL/kg/hr) 300  (0.4) 950 1250         Urine Voided        Emesis/NG output 400  400         Output (ml) (Nasogastric Tube 02/04/17) 400  400       Stool            Stool Occurrence(s)  1 x 1 x       Shift Total  (mL/kg) 700  (12.2) 950  (15.4) 1650  (26.7)      NET -700 250 -450      Weight (kg) 57.6 61.8 61.8 61.8 61.8 61.8

## 2017-02-05 NOTE — PROGRESS NOTES
Shift uneventful. Pt is stable with no signs of distress. NG tube in place with intermittent suction. Bed in lowest position with call bell and phone within reach.

## 2017-02-05 NOTE — PROGRESS NOTES
0720 Bedside report received from JOSELITO Salamanca.     [id number] - 7630 Pt transported for x-ray. 1400 Pt has complaints of face feeling flushed. Face is hot to the touch. Shift uneventful.

## 2017-02-05 NOTE — PROGRESS NOTES
Progress Note    Patient: Krishna Martinez MRN: [id number]  CSN: [id number]    YOB: 1955  Age: 59 y.o. Sex: female    DOA: 2/4/2017 LOS:  LOS: 1 day                    Subjective:     Patient states feels better. Had BM and passed some gas. C/O headache. Objective:      Visit Vitals    /55 (BP 1 Location: Left arm, BP Patient Position: At rest)    Pulse 78    Temp 97.3 °F (36.3 °C)    Resp 18    Ht 5' 4\" (1.626 m)    Wt 61.8 kg (136 lb 3.2 oz)    SpO2 98%    BMI 23.38 kg/m2       Physical Exam:  General appearance: Alert, no distress  Lungs: clear to auscultation bilaterally  Heart: regular rate and rhythm, S1, S2 normal, no murmur, click, rub or gallop  Abdomen: soft, less tenderness, non distended  Extremities: extremities normal, atraumatic, no cyanosis or edema, calfs non-tender  Skin: Skin color, texture, turgor normal. No rashes or lesions  Psych: Alert, oriented x3, appropriate    Intake and Output:  Current Shift:     Last three shifts:  02/03 1901 - 02/05 0700  In: 1200 [I.V.:1200]  Out: 1650 [Urine:1250]    Lab/Data Reviewed: All lab results for the last 24 hours reviewed. Medications Reviewed. Assessment/Plan     Active Problems:    SBO (small bowel obstruction) ([address] (2/4/2017)      UTI (urinary tract infection) (2/4/2017)      Improved partial SBO. Seen by GI. Hx to GI is that she has a know anastomotic stenosis. She need further w/u and small bowel study to look at other areas for stenosis. As per GI she needs f/u for her medication as well. She needs closer f/u and has been having problems for awhile it appears. Will check films today and try to d/c for outpatient f/u if possible.

## 2017-02-06 ENCOUNTER — APPOINTMENT (OUTPATIENT)
Dept: GENERAL RADIOLOGY | Age: 62
DRG: 386 | End: 2017-02-06
Attending: INTERNAL MEDICINE
Payer: COMMERCIAL

## 2017-02-06 PROCEDURE — 83993 ASSAY FOR CALPROTECTIN FECAL: CPT | Performed by: INTERNAL MEDICINE

## 2017-02-06 PROCEDURE — 65270000029 HC RM PRIVATE

## 2017-02-06 PROCEDURE — 74011250636 HC RX REV CODE- 250/636: Performed by: SURGERY

## 2017-02-06 PROCEDURE — 74011000255 HC RX REV CODE- 255: Performed by: SURGERY

## 2017-02-06 PROCEDURE — 90471 IMMUNIZATION ADMIN: CPT

## 2017-02-06 PROCEDURE — 90686 IIV4 VACC NO PRSV 0.5 ML IM: CPT | Performed by: SURGERY

## 2017-02-06 PROCEDURE — 74250 X-RAY XM SM INT 1CNTRST STD: CPT

## 2017-02-06 PROCEDURE — 89055 LEUKOCYTE ASSESSMENT FECAL: CPT | Performed by: INTERNAL MEDICINE

## 2017-02-06 PROCEDURE — 83520 IMMUNOASSAY QUANT NOS NONAB: CPT | Performed by: INTERNAL MEDICINE

## 2017-02-06 PROCEDURE — 74011250637 HC RX REV CODE- 250/637: Performed by: INTERNAL MEDICINE

## 2017-02-06 PROCEDURE — 74011250636 HC RX REV CODE- 250/636: Performed by: INTERNAL MEDICINE

## 2017-02-06 RX ORDER — PREDNISONE 20 MG/1
40 TABLET ORAL
Status: DISCONTINUED | OUTPATIENT
Start: 2017-02-07 | End: 2017-02-07 | Stop reason: HOSPADM

## 2017-02-06 RX ORDER — LOPERAMIDE HYDROCHLORIDE 2 MG/1
2 CAPSULE ORAL 4 TIMES DAILY
Status: DISCONTINUED | OUTPATIENT
Start: 2017-02-06 | End: 2017-02-07 | Stop reason: HOSPADM

## 2017-02-06 RX ADMIN — LOPERAMIDE HYDROCHLORIDE 2 MG: 2 CAPSULE ORAL at 22:46

## 2017-02-06 RX ADMIN — METHYLPREDNISOLONE SODIUM SUCCINATE 20 MG: 40 INJECTION, POWDER, FOR SOLUTION INTRAMUSCULAR; INTRAVENOUS at 21:24

## 2017-02-06 RX ADMIN — Medication 10 ML: at 21:22

## 2017-02-06 RX ADMIN — METHYLPREDNISOLONE SODIUM SUCCINATE 20 MG: 40 INJECTION, POWDER, FOR SOLUTION INTRAMUSCULAR; INTRAVENOUS at 10:00

## 2017-02-06 RX ADMIN — INFLUENZA VIRUS VACCINE 0.5 ML: 15; 15; 15; 15 SUSPENSION INTRAMUSCULAR at 18:17

## 2017-02-06 RX ADMIN — BARIUM SULFATE 600 ML: 240 SUSPENSION ORAL at 08:50

## 2017-02-06 RX ADMIN — DEXTROSE MONOHYDRATE, SODIUM CHLORIDE, AND POTASSIUM CHLORIDE 125 ML/HR: 50; 4.5; 1.49 INJECTION, SOLUTION INTRAVENOUS at 09:54

## 2017-02-06 RX ADMIN — DEXTROSE MONOHYDRATE, SODIUM CHLORIDE, AND POTASSIUM CHLORIDE 125 ML/HR: 50; 4.5; 1.49 INJECTION, SOLUTION INTRAVENOUS at 00:38

## 2017-02-06 RX ADMIN — Medication 10 ML: at 18:17

## 2017-02-06 RX ADMIN — CYANOCOBALAMIN 1000 MCG: 1000 INJECTION, SOLUTION INTRAMUSCULAR at 00:44

## 2017-02-06 RX ADMIN — DIPHENHYDRAMINE HYDROCHLORIDE 12.5 MG: 50 INJECTION, SOLUTION INTRAMUSCULAR; INTRAVENOUS at 00:46

## 2017-02-06 NOTE — ROUTINE PROCESS
Bedside and Verbal shift change report given to JOSELITO Cota RN (oncoming nurse) by Rick Arthur (offgoing nurse). Report included the following information SBAR, Kardex, Intake/Output and MAR.

## 2017-02-06 NOTE — PROGRESS NOTES
AFVSS  BM yesterday  Patient in xray getting small bowel study apparently ordered by GI. Also , NG was removed by GI. Waiting for small bowel study to be completed.

## 2017-02-06 NOTE — PROGRESS NOTES
Shift Summary:  Uneventful shift. Patient slept through the night after receiving Benadryl 12.5 mg IV to assist with sleep. Patient remained NPO overnight.     Patient Vitals for the past 12 hrs:   Temp Pulse Resp BP SpO2   02/06/17 0711 97.4 °F (36.3 °C) 80 20 153/58 99 %   02/06/17 0357 98.1 °F (36.7 °C) 78 18 137/61 100 %   02/05/17 2314 98.4 °F (36.9 °C) 78 18 148/55 97 %

## 2017-02-06 NOTE — ROUTINE PROCESS
Bedside and Verbal shift change report given to Frantz Lopez RN (oncoming nurse) by JOSELITO Valadez RN (offgoing nurse). Report included the following information SBAR, Kardex, Intake/Output and MAR.

## 2017-02-06 NOTE — PROGRESS NOTES
Shift summary: Received verbal orders to remove NG Tube. NG tube removed. Nausea managed with IV zofran. Head pain managed with IV Dilaudid. Pt appears to be resting comfortably. No visible signs of distress.

## 2017-02-06 NOTE — PROGRESS NOTES
Gastrointestinal Progress Note    Patient Name: Krishna Martinez    FYJZK'R Date: 2/5/2017    Admit Date: 2/4/2017    Subjective:     Diet: Patient is on NPO and is tolerating. Naso gastric tube did not drain much since yesterday. It has been clamped x 3 hours    Nausea is not present. Vomiting is not present. Pain: Patient does not complain of abdominal pain. Bowel Movements: a small Normal this am    Bleeding:  None    Current Facility-Administered Medications   Medication Dose Route Frequency    cyanocobalamin (VITAMIN B12) injection 1,000 mcg  1,000 mcg IntraMUSCular Q30D    0.9% sodium chloride infusion  999 mL/hr IntraVENous CONTINUOUS    sodium chloride (NS) flush 5-10 mL  5-10 mL IntraVENous Q8H    sodium chloride (NS) flush 5-10 mL  5-10 mL IntraVENous PRN    HYDROmorphone (PF) (DILAUDID) injection 1 mg  1 mg IntraVENous Q4H PRN    ondansetron (ZOFRAN) injection 4 mg  4 mg IntraVENous Q4H PRN    diphenhydrAMINE (BENADRYL) injection 12.5 mg  12.5 mg IntraVENous Q4H PRN    dextrose 5% - 0.45% NaCl with KCl 20 mEq/L infusion  125 mL/hr IntraVENous CONTINUOUS    methylPREDNISolone (PF) (SOLU-MEDROL) injection 20 mg  20 mg IntraVENous Q12H    influenza vaccine 2016-17 (36mos+)(PF) (FLUZONE/FLUARIX/FLULAVAL QUAD) injection 0.5 mL  0.5 mL IntraMUSCular PRIOR TO DISCHARGE          Objective:     Visit Vitals    /70 (BP 1 Location: Right arm, BP Patient Position: At rest)    Pulse 86    Temp 98 °F (36.7 °C)    Resp 18    Ht 5' 4\" (1.626 m)    Wt 61.8 kg (136 lb 3.2 oz)    SpO2 91%    BMI 23.38 kg/m2       02/04 0701 - 02/05 1900  In: 1200 [I.V.:1200]  Out: 2300 [Urine:1900]    General appearance: alert, cooperative, no distress, appears stated age, uncomfortable from right sided large NASO-Gastric tube  Head: Normocephalic, both cheeks are erythematous,   Abdomen: soft, non-tender.  Bowel sounds normal. No masses,  no organomegaly    Data Review:    Labs: Results:       Chemistry Recent Labs      02/05/17 0228 02/04/17 0407   GLU  195*  148*   NA  140  143   K  4.2  4.1   CL  105  103   CO2  24  26   BUN  9  13   CREA  1.07  1.19   CA  7.9*  9.3   AGAP  11  14   BUCR  8*  11*   AP   --   48   TP   --   7.9   ALB   --   4.5   GLOB   --   3.4   AGRAT   --   1.3      CBC w/Diff Recent Labs      02/05/17 0228 02/04/17 0407   WBC  9.6  16.4*   RBC  3.85*  4.62   HGB  11.5*  13.8   HCT  34.7*  41.5   PLT  401  450*   GRANS  86*  77*   LYMPH  10*  13*   EOS  0  2      Coagulation No results for input(s): PTP, INR, APTT in the last 72 hours. No lab exists for component: INREXT    Liver Enzymes Recent Labs      02/04/17 0407   TP  7.9   ALB  4.5   AP  48   SGOT  32          Assessment:     Active Problems:    SBO (small bowel obstruction) ([address] (2/4/2017)      UTI (urinary tract infection) (2/4/2017)        Plan:     Sub-occlusion related to stenotic Crohn's disease present since 1980's, sp 2 small bowel resection > 3 feets. has been on Humira x 1.5 years. The occlusion appears to have resolved  Plan -remove NG tube start water do tomorrow small bowel series. CRP 1.4. Eventually would need to fu with Dr Justin Hodge with evaluation of the Humira blood level and antibodies.  For now keep on tapered dose of steroids over at least one month 40 mg reduce by 10 mg every week    Vit B12 deiciency secondary to large terminal ileum resection Plan would need supplementation for life  Iron saturation 11 would need some iron supplement  Red cheeks related to IV steroids  UTI  High BS caused by steroids Hgb A1c 5.8 so she is borderline diabetic    Brian Sidhu MD  February 5, 2017

## 2017-02-06 NOTE — ROUTINE PROCESS
Bedside and Verbal shift change report given to JOSELITO Dyer (oncoming nurse) by Toni Hull RN   (offgoing nurse). Report included the following information SBAR, Kardex, Intake/Output, MAR, Recent Results and Med Rec Status.

## 2017-02-06 NOTE — PROGRESS NOTES
conducted a Follow up consultation and Spiritual Assessment for [facility], who is a 59 y.o.,female. Consult for Advance Medical Directive for patient. At first she said she wasn't interested. With her permission I quickly reviewed the form. She had tears in her eyes and said that she was recently  from her  of 40 years. She does have support. But worries that her Chron's disease may have been a flare up from stress. We reviewed her support system - 2 sons, 5 grands who she adores, as well as friends and co-workers who have all been great. The  provided the following Interventions:  Continued the relationship of care and support. Listened empathically. Offered prayer and assurance of continued prayer on patients behalf. Chart reviewed. The following outcomes were achieved:  Patient expressed gratitude for Spiritual Care visit. Assessment:  There are no further spiritual or Adventist issues which require Spiritual Care Services intervention at this time. Plan:  Chaplains will continue to follow and will provide pastoral care as needed or requested.  recommends bedside caregivers page the  on duty if patient shows signs of acute spiritual or emotional distress. [address], [place].    Board Certified   [phone] - Office

## 2017-02-07 VITALS
TEMPERATURE: 97.4 F | HEART RATE: 79 BPM | RESPIRATION RATE: 17 BRPM | WEIGHT: 138.4 LBS | HEIGHT: 64 IN | DIASTOLIC BLOOD PRESSURE: 76 MMHG | OXYGEN SATURATION: 97 % | BODY MASS INDEX: 23.63 KG/M2 | SYSTOLIC BLOOD PRESSURE: 148 MMHG

## 2017-02-07 LAB
1,25(OH)2D3 SERPL-MCNC: 112 PG/ML (ref 19.9–79.3)
ATRIAL RATE: 63 BPM
CALCULATED P AXIS, ECG09: 63 DEGREES
CALCULATED R AXIS, ECG10: 55 DEGREES
CALCULATED T AXIS, ECG11: 59 DEGREES
DIAGNOSIS, 93000: NORMAL
P-R INTERVAL, ECG05: 170 MS
Q-T INTERVAL, ECG07: 438 MS
QRS DURATION, ECG06: 86 MS
QTC CALCULATION (BEZET), ECG08: 448 MS
VENTRICULAR RATE, ECG03: 63 BPM
WBC #/AREA STL HPF: NORMAL /HPF (ref 0–4)

## 2017-02-07 PROCEDURE — 74011250637 HC RX REV CODE- 250/637: Performed by: INTERNAL MEDICINE

## 2017-02-07 PROCEDURE — 74011636637 HC RX REV CODE- 636/637: Performed by: INTERNAL MEDICINE

## 2017-02-07 RX ORDER — PREDNISONE 20 MG/1
40 TABLET ORAL DAILY
Qty: 40 TAB | Refills: 0 | Status: SHIPPED | OUTPATIENT
Start: 2017-02-07

## 2017-02-07 RX ADMIN — Medication 10 ML: at 07:03

## 2017-02-07 RX ADMIN — LOPERAMIDE HYDROCHLORIDE 2 MG: 2 CAPSULE ORAL at 08:54

## 2017-02-07 RX ADMIN — LOPERAMIDE HYDROCHLORIDE 2 MG: 2 CAPSULE ORAL at 13:19

## 2017-02-07 RX ADMIN — PREDNISONE 40 MG: 20 TABLET ORAL at 08:54

## 2017-02-07 NOTE — PROGRESS NOTES
Shift summary: Patient rested during shift. Complaining of sore bottom related to frequent watery stool. Protective ointment provided to patient for bottom. Imodium order placed by physician (Dr Anabelle Rodriguez).

## 2017-02-07 NOTE — DISCHARGE SUMMARY
Discharge Summary    Patient: Monico Cowan MRN: [id number]  CSN: [id number]    YOB: 1955  Age: 59 y.o. Sex: female    DOA: 2/4/2017 LOS:  LOS: 3 days   Discharge Date:      Admission Diagnoses: SBO (small bowel obstruction) ([facility]  SBO (small bowel obstruction) ([facility]    Discharge Diagnoses:    Problem List as of 2/7/2017  Never Reviewed          Codes Class Noted - Resolved    SBO (small bowel obstruction) ([facility] ICD-10-CM: K56.69  ICD-9-CM: 560.9  2/4/2017 - Present        UTI (urinary tract infection) ICD-10-CM: N39.0  ICD-9-CM: 599.0  2/4/2017 - Present              Discharge Condition: Good    Hospital Course: Admitted for pSBO, hx Crohn's and bowel resections x 2, treated with NG.  CT showed stenosis at anastomosis but small bowel study was normal and she has improved. GI seen and rec steroid taper. She was on Humira and has hx of this problem on last cscope she says. Consults: Gastroenterology    Significant Diagnostic Studies: See chart    Discharge Medications:     Current Discharge Medication List      START taking these medications    Details   predniSONE (DELTASONE) 20 mg tablet Take 2 Tabs by mouth daily. Qty: 40 Tab, Refills: 0         CONTINUE these medications which have NOT CHANGED    Details   adalimumab (HUMIRA PEN) 40 mg/0.8 mL pnkt by SubCUTAneous route. Indications: CROHN'S DISEASE      LEVOTHYROXINE SODIUM (SYNTHROID PO) Take 112 mcg by mouth daily. SERTRALINE HCL (ZOLOFT PO) Take 50 mg by mouth daily. ATENOLOL PO Take 50 mg by mouth nightly. Fenofibrate 120 mg tab Take 120 mg by mouth nightly. levocetirizine (XYZAL) 5 mg tablet Take 5 mg by mouth nightly. montelukast (SINGULAIR) 10 mg tablet Take 10 mg by mouth nightly. colestipol (COLESTID) 1 gram tablet Take 2 g by mouth two (2) times a day. Activity: Activity as tolerated    Diet: Regular Diet    Wound Care: None needed    Follow-up:  Will f/u with GI ASAP for steroid taper and probably [facility]CV for surgery.

## 2017-02-07 NOTE — PROGRESS NOTES
Gastrointestinal Progress Note    Patient Name: William Butler    THBLR'W Date: 2/6/2017    Admit Date: 2/4/2017    Subjective:     Diet: Patient is on regular diet and is tolerating. Naso gastric tube removed yesterday. Nausea is not present. Vomiting is not present. Pain: Patient does not complain of abdominal pain. Bowel Movements: liquid like before 6 to 8/ day  Bleeding:  None    Current Facility-Administered Medications   Medication Dose Route Frequency    loperamide (IMODIUM) capsule 2 mg  2 mg Oral QID    cyanocobalamin (VITAMIN B12) injection 1,000 mcg  1,000 mcg IntraMUSCular Q30D    sodium chloride (NS) flush 5-10 mL  5-10 mL IntraVENous Q8H    sodium chloride (NS) flush 5-10 mL  5-10 mL IntraVENous PRN    ondansetron (ZOFRAN) injection 4 mg  4 mg IntraVENous Q4H PRN    diphenhydrAMINE (BENADRYL) injection 12.5 mg  12.5 mg IntraVENous Q4H PRN          Objective:     Visit Vitals    /76 (BP 1 Location: Left arm, BP Patient Position: At rest)    Pulse 79    Temp 98.2 °F (36.8 °C)    Resp 20    Ht 5' 4\" (1.626 m)    Wt 62.8 kg (138 lb 6.4 oz)    SpO2 100%    BMI 23.76 kg/m2       02/05 0701 - 02/06 1900  In: 2529 [P.O.:750; I.V.:1779]  Out: 3400 [Urine:3400]    General appearance: alert, cooperative, no distress, appears stated age, uncomfortable from right sided large NASO-Gastric tube  Head: Normocephalic, both cheeks are erythematous,   Abdomen: soft, non-tender.  Bowel sounds normal. No masses,  no organomegaly    Data Review:    Labs: Results:       Chemistry Recent Labs      02/05/17   0228  02/04/17   0407   GLU  195*  148*   NA  140  143   K  4.2  4.1   CL  105  103   CO2  24  26   BUN  9  13   CREA  1.07  1.19   CA  7.9*  9.3   AGAP  11  14   BUCR  8*  11*   AP   --   48   TP   --   7.9   ALB   --   4.5   GLOB   --   3.4   AGRAT   --   1.3      CBC w/Diff Recent Labs      02/05/17   0228  02/04/17   0407   WBC  9.6  16.4*   RBC  3.85*  4.62   HGB  11.5*  13.8   HCT 34.7*  41.5   PLT  401  450*   GRANS  86*  77*   LYMPH  10*  13*   EOS  0  2      Coagulation No results for input(s): PTP, INR, APTT in the last 72 hours. No lab exists for component: INREXT, INREXT    Liver Enzymes Recent Labs      02/04/17   0407   TP  7.9   ALB  4.5   AP  48   SGOT  32          Assessment:     Active Problems:    SBO (small bowel obstruction) ([address] (2/4/2017)      UTI (urinary tract infection) (2/4/2017)        Plan:     Sub-occlusion related to stenotic Crohn's disease present since 1980's, sp 2 small bowel resection > 3 feets. has been on Humira x 1.5 years. The occlusion appears to have resolved  Plan -remove NG tube start water do tomorrow small bowel series. CRP 1.4. Eventually would need to fu with Dr Vivian Penn with evaluation of the Humira blood level and antibodies. For now keep on tapered dose of steroids over at least one month 40 mg reduce by 10 mg every week  Over 4 weeks which should not be very harmful just to give her enough time to see her GI doctor. Chronic watery diarrhea in great part because of short bowel, but for sure it is excessive. She was taking 10 imodium a day that too is excessive.  Check calprotectine and WBC in stools    Vit B12 deiciency secondary to large terminal ileum resection Plan would need supplementation for life she was taking it by injection but missed the last 2 months  Iron saturation 11 would need some iron supplement  Red cheeks related to IV steroids resolved  UTI  High BS caused by steroids Hgb A1c 5.8 so she is borderline diabetic stop steroid as the the small bowel series was negative and CRP only 1.4    Jorge L Parrish MD  February 6, 2017

## 2017-02-07 NOTE — PROGRESS NOTES
Pt Naa Persaud, 58 yo female. Pt in bed during bedside report. No report of pain, N/V. Pt transported to radiology via wheelchair. Pt returned to room, resting comfortably. Pt ambulated in halls during shift. States she feels much better and enjoys the chance to stretch her legs. Pt received flu shot. Pt resting comfortably during bedside report.

## 2017-02-07 NOTE — ROUTINE PROCESS
Bedside and Verbal shift change report given to Juanita Dale RN (oncoming nurse) by Mandeep Buenrostro RN (offgoing nurse). Report included the following information SBAR, Kardex, ED Summary, Procedure Summary, Intake/Output, MAR, Recent Results and Med Rec Status.

## 2017-02-07 NOTE — ROUTINE PROCESS
Bedside and Verbal shift change report given to TRENT Reddy RN (oncoming nurse) by Charlie Crowell RN (offgoing nurse). Report included the following information SBAR, Kardex, Procedure Summary, Intake/Output, MAR and Recent Results.

## 2017-02-07 NOTE — PROGRESS NOTES
Discharge Reassessment Plan:  Low Risk    RRAT Score:  1 - 12     Low Risk Care Transition Interventions:  1. Discharge transition plan:    2. Involved patient/caregiver in assessment, planning, education and implement of intervention. 3. CM daily patient care huddles/interdisciplinary rounds were completed. 4. PCP/Specialist appointment within 5 days made prior to discharge. Date/Time  5. Facilitated transportation and logistics for follow-up appointments. Handoff to [facility] Nurse Navigator or PCP practice. Reviewed chart and met with this 59yo  female pt for assessment of discharge planning needs. Pt admitted due to SBO. Pt states she has Crohn's disease and sees gastroenterologist Dr. Bisi Liu in her [place] office. Pt states she resides alone; she is  from her , but he will provide her transport home from the hospital. CM reviewed with pt that her medical follow-up appt with Dr. Bisi Liu is scheduled for 2/9/17 at 10am in her [place] office because they could get her in sooner there. Pt was agreeable to this discharge plan. Pt inquired about support groups for pts with Crohn's disease. CM provided pt with information re: the Crohn's foundation, but there were no local support groups listed in this area. CM also left message for the practitioner at Dr. Bobbi Peralta office to request she provide pt with support group information at pt's appt this week, if their office recommends a particular group. CM observed pt ambulating without assistance around the nursing station and there are no DME needs noted. Pt states her PCP is Dr. Josh Mackenzie and pt has [facility] health insurance. There are no other discharge planning needs noted. Care Management Interventions  PCP Verified by CM:  Yes  Transition of Care Consult (CM Consult): Discharge Planning  MyChart Signup: No  Discharge Durable Medical Equipment: No  Health Maintenance Reviewed: Yes  Physical Therapy Consult: No  Occupational Therapy Consult: No  Speech Therapy Consult: No  Current Support Network: Lives Alone  Confirm Follow Up Transport: Family  Plan discussed with Pt/Family/Caregiver: Yes  Discharge Location  Discharge Placement: Home

## 2017-02-07 NOTE — ROUTINE PROCESS
Dual AVS reviewed with Rosalina Ledezma RN. All medications reviewed individually with patient. Opportunities for questions and concerns provided. Patient discharged via car  Patient's arm band appropriately discarded.

## 2017-02-07 NOTE — PROGRESS NOTES
AFVSS   Feels OK tolerating reg diet  Abd - benign    SB study normal    Probable intermittent pSBO    GI rec steroid to go home and f/u with her GI ASAP.

## 2017-02-07 NOTE — DISCHARGE INSTRUCTIONS
DISCHARGE SUMMARY from Nurse    The following personal items are in your possession at time of discharge:    Dental Appliances: None  Visual Aid: Glasses     Home Medications: None  Jewelry: Ring (wedding ring)  Clothing: Bathrobe, Undergarments, Shirt, Pants, Footwear  Other Valuables: Cell Phone ()             PATIENT INSTRUCTIONS:    After general anesthesia or intravenous sedation, for 24 hours or while taking prescription Narcotics:  · Limit your activities  · Do not drive and operate hazardous machinery  · Do not make important personal or business decisions  · Do  not drink alcoholic beverages  · If you have not urinated within 8 hours after discharge, please contact your surgeon on call. Report the following to your surgeon:  · Excessive pain, swelling, redness or odor of or around the surgical area  · Temperature over 100.5  · Nausea and vomiting lasting longer than 4 hours or if unable to take medications  · Any signs of decreased circulation or nerve impairment to extremity: change in color, persistent  numbness, tingling, coldness or increase pain  · Any questions        What to do at Home:  Recommended activity: Activity as tolerated    If you experience any of the following symptoms throwing up blood or coffee ground looking vomit, please follow up with Dr. Molly Kendall or go to the emergency room. *  Please give a list of your current medications to your Primary Care Provider. *  Please update this list whenever your medications are discontinued, doses are      changed, or new medications (including over-the-counter products) are added. *  Please carry medication information at all times in case of emergency situations. These are general instructions for a healthy lifestyle:    No smoking/ No tobacco products/ Avoid exposure to second hand smoke    Surgeon General's Warning:  Quitting smoking now greatly reduces serious risk to your health.     Obesity, smoking, and sedentary lifestyle greatly increases your risk for illness    A healthy diet, regular physical exercise & weight monitoring are important for maintaining a healthy lifestyle    You may be retaining fluid if you have a history of heart failure or if you experience any of the following symptoms:  Weight gain of 3 pounds or more overnight or 5 pounds in a week, increased swelling in our hands or feet or shortness of breath while lying flat in bed. Please call your doctor as soon as you notice any of these symptoms; do not wait until your next office visit. Recognize signs and symptoms of STROKE:    F-face looks uneven    A-arms unable to move or move unevenly    S-speech slurred or non-existent    T-time-call 911 as soon as signs and symptoms begin-DO NOT go       Back to bed or wait to see if you get better-TIME IS BRAIN. Warning Signs of HEART ATTACK     Call 911 if you have these symptoms:   Chest discomfort. Most heart attacks involve discomfort in the center of the chest that lasts more than a few minutes, or that goes away and comes back. It can feel like uncomfortable pressure, squeezing, fullness, or pain.  Discomfort in other areas of the upper body. Symptoms can include pain or discomfort in one or both arms, the back, neck, jaw, or stomach.  Shortness of breath with or without chest discomfort.  Other signs may include breaking out in a cold sweat, nausea, or lightheadedness. Don't wait more than five minutes to call 911 - MINUTES MATTER! Fast action can save your life. Calling 911 is almost always the fastest way to get lifesaving treatment. Emergency Medical Services staff can begin treatment when they arrive -- up to an hour sooner than if someone gets to the hospital by car. The discharge information has been reviewed with the patient. The patient verbalized understanding.     Discharge medications reviewed with the patient and appropriate educational materials and side effects teaching were provided. Patient armband removed and shredded           Abdominal Pain: Care Instructions  Your Care Instructions    Abdominal pain has many possible causes. Some aren't serious and get better on their own in a few days. Others need more testing and treatment. If your pain continues or gets worse, you need to be rechecked and may need more tests to find out what is wrong. You may need surgery to correct the problem. Don't ignore new symptoms, such as fever, nausea and vomiting, urination problems, pain that gets worse, and dizziness. These may be signs of a more serious problem. Your doctor may have recommended a follow-up visit in the next 8 to 12 hours. If you are not getting better, you may need more tests or treatment. The doctor has checked you carefully, but problems can develop later. If you notice any problems or new symptoms, get medical treatment right away. Follow-up care is a key part of your treatment and safety. Be sure to make and go to all appointments, and call your doctor if you are having problems. It's also a good idea to know your test results and keep a list of the medicines you take. How can you care for yourself at home? · Rest until you feel better. · To prevent dehydration, drink plenty of fluids, enough so that your urine is light yellow or clear like water. Choose water and other caffeine-free clear liquids until you feel better. If you have kidney, heart, or liver disease and have to limit fluids, talk with your doctor before you increase the amount of fluids you drink. · If your stomach is upset, eat mild foods, such as rice, dry toast or crackers, bananas, and applesauce. Try eating several small meals instead of two or three large ones. · Wait until 48 hours after all symptoms have gone away before you have spicy foods, alcohol, and drinks that contain caffeine. · Do not eat foods that are high in fat.   · Avoid anti-inflammatory medicines such as aspirin, ibuprofen (Advil, Motrin), and naproxen (Aleve). These can cause stomach upset. Talk to your doctor if you take daily aspirin for another health problem. When should you call for help? Call 911 anytime you think you may need emergency care. For example, call if:  · You passed out (lost consciousness). · You pass maroon or very bloody stools. · You vomit blood or what looks like coffee grounds. · You have new, severe belly pain. Call your doctor now or seek immediate medical care if:  · Your pain gets worse, especially if it becomes focused in one area of your belly. · You have a new or higher fever. · Your stools are black and look like tar, or they have streaks of blood. · You have unexpected vaginal bleeding. · You have symptoms of a urinary tract infection. These may include:  ¨ Pain when you urinate. ¨ Urinating more often than usual.  ¨ Blood in your urine. · You are dizzy or lightheaded, or you feel like you may faint. Watch closely for changes in your health, and be sure to contact your doctor if:  · You are not getting better after 1 day (24 hours). Where can you learn more? Go to http://jose m-ruthy.info/. Enter R468 in the search box to learn more about \"Abdominal Pain: Care Instructions. \"  Current as of: May 27, 2016  Content Version: 11.1  © 9301-3883 [employer] Care instructions adapted under license by [employer] (which disclaims liability or warranty for this information). If you have questions about a medical condition or this instruction, always ask your healthcare professional. [place] [address] any warranty or liability for your use of this information.

## 2017-02-09 LAB — CALPROTECTIN STL-MCNT: <16 UG/G (ref 0–120)

## 2017-02-10 LAB — ELASTASE PANC STL-MCNT: 383 UG ELAST./G

## 2023-09-19 NOTE — IP AVS SNAPSHOT
Current Discharge Medication List  
  
Take these medications at their scheduled times Dose & Instructions Dispensing Information Comments Morning Noon Evening Bedtime ATENOLOL PO Your next dose is: Today, Tomorrow Other:  ____________ Dose:  50 mg Take 50 mg by mouth nightly. Refills:  0  
     
   
   
   
  
 colestipol 1 gram tablet Commonly known as:  COLESTID Your next dose is: Today, Tomorrow Other:  ____________ Dose:  2 g Take 2 g by mouth two (2) times a day. Refills:  0 Fenofibrate 120 mg Tab Your next dose is: Today, Tomorrow Other:  ____________ Dose:  120 mg Take 120 mg by mouth nightly. Refills:  0  
     
   
   
   
  
 levocetirizine 5 mg tablet Commonly known as:  Norman Bishop Your next dose is: Today, Tomorrow Other:  ____________ Dose:  5 mg Take 5 mg by mouth nightly. Refills:  0  
     
   
   
   
  
 montelukast 10 mg tablet Commonly known as:  SINGULAIR Your next dose is: Today, Tomorrow Other:  ____________ Dose:  10 mg Take 10 mg by mouth nightly. Refills:  0  
     
   
   
   
  
 predniSONE 20 mg tablet Commonly known as:  Shade Ok Your next dose is: Today, Tomorrow Other:  ____________ Dose:  40 mg Take 2 Tabs by mouth daily. Quantity:  40 Tab Refills:  0  
     
   
   
   
  
 SYNTHROID PO Your next dose is: Today, Tomorrow Other:  ____________ Dose:  112 mcg Take 112 mcg by mouth daily. Refills:  0  
     
   
   
   
  
 ZOLOFT PO Your next dose is: Today, Tomorrow Other:  ____________ Dose:  50 mg Take 50 mg by mouth daily. Refills:  0 Take these medications as directed Dose & Instructions Dispensing Information Comments Morning Noon Evening Bedtime   Ace  HUMIRA PEN 40 mg/0.8 mL Pnkt Generic drug:  adalimumab Your next dose is: Today, Tomorrow Other:  ____________  
   
   
 by SubCUTAneous route. Indications: CROHN'S DISEASE Refills:  0 Where to Get Your Medications Information about where to get these medications is not yet available ! Ask your nurse or doctor about these medications  
  predniSONE 20 mg tablet "Oscar Chawla MD  Family Medicine      "